# Patient Record
Sex: MALE | Race: WHITE
[De-identification: names, ages, dates, MRNs, and addresses within clinical notes are randomized per-mention and may not be internally consistent; named-entity substitution may affect disease eponyms.]

---

## 2018-08-31 ENCOUNTER — HOSPITAL ENCOUNTER (OUTPATIENT)
Dept: HOSPITAL 92 - CT | Age: 23
Discharge: HOME | End: 2018-08-31
Payer: COMMERCIAL

## 2018-08-31 DIAGNOSIS — Q67.6: Primary | ICD-10-CM

## 2018-08-31 PROCEDURE — 71250 CT THORAX DX C-: CPT

## 2018-08-31 NOTE — CT
CT OF THE THORAX WITHOUT IV CONTRAST:

 

INDICATION: 

Pectus excavatum deformity.

 

COMPARISON: 

Prior exam dated 6/20/16.

 

TECHNIQUE: 

Noncontrast CT images were obtained with expiration.  Axial coronal reformatted images were construct
ed from the raw data.  

 

FINDINGS: 

There is some subsegmental volume loss within the lower lobe likely related to expiratory phase.  Gas
 is present within the esophagus.  The visualized upper abdomen reveals no definite acute abnormality
.  

 

The minimal AP dimension of the thoracic cage is seen on image 47 of series 2 measuring 8.4 cm.  The 
transverse thoracic cavity dimension at this level is 25.9 cm.  The Haler index was 3.0.

 

No definite acute osseous abnormality is evident.  

 

IMPRESSION: 

Mild pectus excavatum deformity.  Haler index measures 3.0 on the current study.

 

POS: YANETH